# Patient Record
Sex: MALE | Race: WHITE | URBAN - METROPOLITAN AREA
[De-identification: names, ages, dates, MRNs, and addresses within clinical notes are randomized per-mention and may not be internally consistent; named-entity substitution may affect disease eponyms.]

---

## 2018-01-08 ENCOUNTER — TELEPHONE (OUTPATIENT)
Dept: INTERNAL MEDICINE CLINIC | Age: 33
End: 2018-01-08

## 2018-01-08 NOTE — TELEPHONE ENCOUNTER
Received refill request from [facility] in [place], [place] that needs refill of lexapro 10mg one po daily. Last seen on 3/1/16. Needs OV. Plus if he has moved to [place], he needs to find a provider there.

## 2018-01-08 NOTE — LETTER
1/9/2018 4:37 PM 
 
Mr. Samuel Suero 
[address] [id number] Dear  Leslie Oris: We have made several attempts to reach you. We've received your recent medication refill request, however this requires an office visit. Please call our office at [phone] and schedule a follow up appointment for your continued care.  
 
 
 
Sincerely, 
 
 
Heena Brandt LPN

## 2018-01-08 NOTE — TELEPHONE ENCOUNTER
Unsuccessful attempt to reach pt for below. Left message for him to call back at his earliest convenience.